# Patient Record
Sex: FEMALE | Race: OTHER | Employment: PART TIME | URBAN - METROPOLITAN AREA
[De-identification: names, ages, dates, MRNs, and addresses within clinical notes are randomized per-mention and may not be internally consistent; named-entity substitution may affect disease eponyms.]

---

## 2023-01-31 ENCOUNTER — APPOINTMENT (OUTPATIENT)
Dept: CT IMAGING | Age: 23
End: 2023-01-31
Attending: EMERGENCY MEDICINE
Payer: MEDICAID

## 2023-01-31 ENCOUNTER — HOSPITAL ENCOUNTER (EMERGENCY)
Age: 23
Discharge: HOME | End: 2023-02-01
Attending: EMERGENCY MEDICINE
Payer: MEDICAID

## 2023-01-31 DIAGNOSIS — F41.1 ANXIETY REACTION: ICD-10-CM

## 2023-01-31 DIAGNOSIS — N30.00 ACUTE CYSTITIS WITHOUT HEMATURIA: ICD-10-CM

## 2023-01-31 DIAGNOSIS — G43.809 OTHER MIGRAINE WITHOUT STATUS MIGRAINOSUS, NOT INTRACTABLE: Primary | ICD-10-CM

## 2023-01-31 LAB
ALBUMIN SERPL-MCNC: 4.2 G/DL (ref 3.5–5)
ALBUMIN/GLOB SERPL: 1.2 (ref 1.1–2.2)
ALP SERPL-CCNC: 80 U/L (ref 45–117)
ALT SERPL-CCNC: 24 U/L (ref 12–78)
ANION GAP BLD CALC-SCNC: 9 (ref 10–20)
ANION GAP SERPL CALC-SCNC: 6 MMOL/L (ref 5–15)
AST SERPL-CCNC: 16 U/L (ref 15–37)
BASE DEFICIT BLD-SCNC: 1.4 MMOL/L
BASOPHILS # BLD: 0.1 K/UL (ref 0–0.1)
BASOPHILS NFR BLD: 1 % (ref 0–1)
BILIRUB SERPL-MCNC: 0.3 MG/DL (ref 0.2–1)
BUN SERPL-MCNC: 12 MG/DL (ref 6–20)
BUN/CREAT SERPL: 18 (ref 12–20)
CA-I BLD-MCNC: 1.19 MMOL/L (ref 1.12–1.32)
CALCIUM SERPL-MCNC: 9.2 MG/DL (ref 8.5–10.1)
CHLORIDE BLD-SCNC: 107 MMOL/L (ref 100–108)
CHLORIDE SERPL-SCNC: 107 MMOL/L (ref 97–108)
CO2 BLD-SCNC: 24 MMOL/L (ref 19–24)
CO2 SERPL-SCNC: 24 MMOL/L (ref 21–32)
COMMENT, HOLDF: NORMAL
CREAT SERPL-MCNC: 0.68 MG/DL (ref 0.55–1.02)
CREAT UR-MCNC: 0.6 MG/DL (ref 0.6–1.3)
DIFFERENTIAL METHOD BLD: ABNORMAL
EOSINOPHIL # BLD: 0.1 K/UL (ref 0–0.4)
EOSINOPHIL NFR BLD: 1 % (ref 0–7)
ERYTHROCYTE [DISTWIDTH] IN BLOOD BY AUTOMATED COUNT: 12.6 % (ref 11.5–14.5)
GLOBULIN SER CALC-MCNC: 3.5 G/DL (ref 2–4)
GLUCOSE BLD STRIP.AUTO-MCNC: 99 MG/DL (ref 74–106)
GLUCOSE SERPL-MCNC: 99 MG/DL (ref 65–100)
HCO3 BLDA-SCNC: 25 MMOL/L
HCT VFR BLD AUTO: 42.6 % (ref 35–47)
HGB BLD-MCNC: 14 G/DL (ref 11.5–16)
IMM GRANULOCYTES # BLD AUTO: 0 K/UL (ref 0–0.04)
IMM GRANULOCYTES NFR BLD AUTO: 0 % (ref 0–0.5)
LACTATE BLD-SCNC: 0.75 MMOL/L (ref 0.4–2)
LYMPHOCYTES # BLD: 2.7 K/UL (ref 0.8–3.5)
LYMPHOCYTES NFR BLD: 24 % (ref 12–49)
MCH RBC QN AUTO: 30.2 PG (ref 26–34)
MCHC RBC AUTO-ENTMCNC: 32.9 G/DL (ref 30–36.5)
MCV RBC AUTO: 91.8 FL (ref 80–99)
MONOCYTES # BLD: 1.3 K/UL (ref 0–1)
MONOCYTES NFR BLD: 11 % (ref 5–13)
NEUTS SEG # BLD: 6.9 K/UL (ref 1.8–8)
NEUTS SEG NFR BLD: 63 % (ref 32–75)
NRBC # BLD: 0 K/UL (ref 0–0.01)
NRBC BLD-RTO: 0 PER 100 WBC
PCO2 BLDV: 45.3 MMHG (ref 41–51)
PH BLDV: 7.34 (ref 7.32–7.42)
PLATELET # BLD AUTO: 356 K/UL (ref 150–400)
PMV BLD AUTO: 9.4 FL (ref 8.9–12.9)
PO2 BLDV: 35 MMHG (ref 25–40)
POTASSIUM BLD-SCNC: 4.2 MMOL/L (ref 3.5–5.5)
POTASSIUM SERPL-SCNC: 3.9 MMOL/L (ref 3.5–5.1)
PROT SERPL-MCNC: 7.7 G/DL (ref 6.4–8.2)
RBC # BLD AUTO: 4.64 M/UL (ref 3.8–5.2)
SAMPLES BEING HELD,HOLD: NORMAL
SAO2 % BLD: 64 %
SODIUM BLD-SCNC: 140 MMOL/L (ref 136–145)
SODIUM SERPL-SCNC: 137 MMOL/L (ref 136–145)
SPECIMEN SITE: ABNORMAL
WBC # BLD AUTO: 11 K/UL (ref 3.6–11)

## 2023-01-31 PROCEDURE — 96375 TX/PRO/DX INJ NEW DRUG ADDON: CPT

## 2023-01-31 PROCEDURE — 85025 COMPLETE CBC W/AUTO DIFF WBC: CPT

## 2023-01-31 PROCEDURE — 96361 HYDRATE IV INFUSION ADD-ON: CPT

## 2023-01-31 PROCEDURE — 96374 THER/PROPH/DIAG INJ IV PUSH: CPT

## 2023-01-31 PROCEDURE — 70450 CT HEAD/BRAIN W/O DYE: CPT

## 2023-01-31 PROCEDURE — 80053 COMPREHEN METABOLIC PANEL: CPT

## 2023-01-31 PROCEDURE — 99285 EMERGENCY DEPT VISIT HI MDM: CPT

## 2023-01-31 PROCEDURE — 74011250636 HC RX REV CODE- 250/636: Performed by: EMERGENCY MEDICINE

## 2023-01-31 PROCEDURE — 82947 ASSAY GLUCOSE BLOOD QUANT: CPT

## 2023-01-31 PROCEDURE — 36415 COLL VENOUS BLD VENIPUNCTURE: CPT

## 2023-01-31 RX ORDER — KETOROLAC TROMETHAMINE 30 MG/ML
15 INJECTION, SOLUTION INTRAMUSCULAR; INTRAVENOUS
Status: COMPLETED | OUTPATIENT
Start: 2023-01-31 | End: 2023-01-31

## 2023-01-31 RX ORDER — LORAZEPAM 2 MG/ML
1 INJECTION INTRAMUSCULAR ONCE
Status: COMPLETED | OUTPATIENT
Start: 2023-01-31 | End: 2023-01-31

## 2023-01-31 RX ORDER — PROCHLORPERAZINE EDISYLATE 5 MG/ML
10 INJECTION INTRAMUSCULAR; INTRAVENOUS
Status: COMPLETED | OUTPATIENT
Start: 2023-01-31 | End: 2023-01-31

## 2023-01-31 RX ADMIN — PROCHLORPERAZINE EDISYLATE 10 MG: 5 INJECTION INTRAMUSCULAR; INTRAVENOUS at 23:36

## 2023-01-31 RX ADMIN — KETOROLAC TROMETHAMINE 15 MG: 30 INJECTION, SOLUTION INTRAMUSCULAR at 23:36

## 2023-01-31 RX ADMIN — LORAZEPAM 1 MG: 2 INJECTION INTRAMUSCULAR; INTRAVENOUS at 23:23

## 2023-01-31 RX ADMIN — SODIUM CHLORIDE 1000 ML: 9 INJECTION, SOLUTION INTRAVENOUS at 23:39

## 2023-02-01 VITALS
RESPIRATION RATE: 18 BRPM | SYSTOLIC BLOOD PRESSURE: 99 MMHG | HEART RATE: 101 BPM | DIASTOLIC BLOOD PRESSURE: 54 MMHG | OXYGEN SATURATION: 98 % | TEMPERATURE: 98.2 F

## 2023-02-01 LAB
AMPHET UR QL SCN: NEGATIVE
APPEARANCE UR: CLEAR
BACTERIA URNS QL MICRO: ABNORMAL /HPF
BARBITURATES UR QL SCN: NEGATIVE
BENZODIAZ UR QL: NEGATIVE
BILIRUB UR QL: NEGATIVE
CANNABINOIDS UR QL SCN: NEGATIVE
COCAINE UR QL SCN: NEGATIVE
COLOR UR: ABNORMAL
DRUG SCRN COMMENT,DRGCM: NORMAL
EPITH CASTS URNS QL MICRO: ABNORMAL /LPF
GLUCOSE UR STRIP.AUTO-MCNC: NEGATIVE MG/DL
HGB UR QL STRIP: NEGATIVE
HYALINE CASTS URNS QL MICRO: ABNORMAL /LPF (ref 0–2)
KETONES UR QL STRIP.AUTO: 40 MG/DL
LEUKOCYTE ESTERASE UR QL STRIP.AUTO: ABNORMAL
METHADONE UR QL: NEGATIVE
NITRITE UR QL STRIP.AUTO: NEGATIVE
OPIATES UR QL: NEGATIVE
PCP UR QL: NEGATIVE
PH UR STRIP: 5.5 (ref 5–8)
PROT UR STRIP-MCNC: NEGATIVE MG/DL
RBC #/AREA URNS HPF: ABNORMAL /HPF (ref 0–5)
SP GR UR REFRACTOMETRY: 1.02
UA: UC IF INDICATED,UAUC: ABNORMAL
UROBILINOGEN UR QL STRIP.AUTO: 0.2 EU/DL (ref 0.2–1)
WBC URNS QL MICRO: ABNORMAL /HPF (ref 0–4)

## 2023-02-01 PROCEDURE — 81001 URINALYSIS AUTO W/SCOPE: CPT

## 2023-02-01 PROCEDURE — 80307 DRUG TEST PRSMV CHEM ANLYZR: CPT

## 2023-02-01 PROCEDURE — 74011250637 HC RX REV CODE- 250/637: Performed by: EMERGENCY MEDICINE

## 2023-02-01 RX ORDER — BUTALBITAL, ACETAMINOPHEN AND CAFFEINE 300; 40; 50 MG/1; MG/1; MG/1
1 CAPSULE ORAL
Qty: 16 CAPSULE | Refills: 0 | Status: SHIPPED | OUTPATIENT
Start: 2023-02-01 | End: 2023-02-04

## 2023-02-01 RX ORDER — CEPHALEXIN 500 MG/1
500 CAPSULE ORAL 2 TIMES DAILY
Qty: 10 CAPSULE | Refills: 0 | Status: SHIPPED | OUTPATIENT
Start: 2023-02-01 | End: 2023-02-06

## 2023-02-01 RX ORDER — IBUPROFEN 800 MG/1
800 TABLET ORAL
Qty: 20 TABLET | Refills: 0 | Status: SHIPPED | OUTPATIENT
Start: 2023-02-01 | End: 2023-02-08

## 2023-02-01 RX ORDER — CEPHALEXIN 250 MG/1
500 CAPSULE ORAL
Status: COMPLETED | OUTPATIENT
Start: 2023-02-01 | End: 2023-02-01

## 2023-02-01 RX ADMIN — CEPHALEXIN 500 MG: 250 CAPSULE ORAL at 04:15

## 2023-02-01 NOTE — DISCHARGE INSTRUCTIONS
It was a pleasure taking care of you in our Emergency Department today. We know that when you come to [facility], you are entrusting us with your health, comfort, and safety. Our physicians and nurses honor that trust, and truly appreciate the opportunity to care for you and your loved ones. We also value your feedback. If you receive a survey about your Emergency Department experience today, please fill it out. We care about our patients' feedback, and we listen to what you have to say.   Thank you!       --- Dr. Roberto Verdin MD

## 2023-02-01 NOTE — ED NOTES
Patient back to baseline per NIHS scale.   says she still notices her talking slower but her screening is negative

## 2023-02-01 NOTE — ED PROVIDER NOTES
[facility] EMERGENCY DEPT  EMERGENCY DEPARTMENT ENCOUNTER       Pt Name: Peggy Meek  MRN: [id number]  [place] 2000  Date of evaluation: 1/31/2023  Provider: Marcellus Ferro MD   PCP: None  Note Started: 11:30 PM 1/31/23     CHIEF COMPLAINT       Chief Complaint   Patient presents with    Tremors     Patient's significant other provides triage information. Patient has had a migraine for the past 3 days, 1900 tonight began with aphasia and garbled speech with slow responses and tremors. Patient reported a significant sharp headache 9/10 at 1700, has resolved to 6/10 at this time. Patient has a history of migraines and multiple concussions, last diagnosed concussion was 2 months ago and SO states she hit her head on the wall 3 weeks ago. HISTORY OF PRESENT ILLNESS: 1 or more elements      History From: Patient and significant other  HPI Limitations : Other (speech changes, unable to provide full history)     Peggy Meek is a 23 y.o. female with history of migraine headaches who presents with a migraine that started 3 days ago and has been up until today typical of her prior migraines. History limited as patient has a generalized tremor, seems very anxious and stuttering which is making it difficult for her to provide full history. History supplemented by significant other who has been with patient all day. She was brought by SO to ed via private vehicle due to generalized tremor and the aforementioned speech changes. She was doing well until about 5pm when she developed a worsening of her typical headache which is occipital and radiates to top of her head. Over past three days has been waxing and waning in intensity, around 5pm got slightly worse and since then improved. Described as sharp, sometimes throbbing. Around the same time she also started having anxiety accompanied by a generalized tremor and difficulty expressing herself due to stuttering.  She is not slurring her words and does not have word finding difficulty. No fever, chills or other recent illness. Takes otc meds prn for headaches, no other meds. Denies associated vision changes or focal weakness or numbness. Normal mental status, following all commands. Based on timeline and atypical nature of symptoms including the new speech deficit, level 2 code stroke alert was called by me from triage. Please see more comprehensive history below under MDM  Nursing Notes were all reviewed in real time as they are made available. Any disagreements addressed in the HPI/MDM. REVIEW OF SYSTEMS      Review of Systems   Constitutional:  Negative for chills and fever. HENT:  Negative for congestion, ear pain, postnasal drip, rhinorrhea, sinus pressure, sore throat, trouble swallowing and voice change. Eyes:  Negative for photophobia and visual disturbance. Respiratory:  Negative for cough and shortness of breath. Cardiovascular:  Negative for chest pain, palpitations and leg swelling. Gastrointestinal:  Negative for abdominal pain, diarrhea, nausea and vomiting. Genitourinary:  Negative for dysuria, flank pain, frequency, hematuria, pelvic pain, vaginal bleeding and vaginal discharge. Musculoskeletal:  Negative for back pain, neck pain and neck stiffness. Allergic/Immunologic: Negative for immunocompromised state. Neurological:  Positive for tremors, speech difficulty and headaches. Negative for dizziness, seizures, syncope, facial asymmetry, weakness, light-headedness and numbness. Hematological:  Negative for adenopathy. Psychiatric/Behavioral:  Negative for confusion. The patient is nervous/anxious. Positives and Pertinent negatives as per HPI and MDM. PAST HISTORY     Past Medical History:  migraines    Past Surgical History:  No past surgical history on file. Family History:  No family history on file. Social History: Allergies:   Allergies   Allergen Reactions    Shellfish Derived Anaphylaxis CURRENT MEDICATIONS      Previous Medications    No medications on file         PHYSICAL EXAM      ED Triage Vitals [01/31/23 2252]   ED Encounter Vitals Group      /78      Pulse (Heart Rate) (!) 110      Resp Rate 20      Temp       Temp src       O2 Sat (%) 98 %      Weight       Height         Physical Exam  Vitals and nursing note reviewed. Constitutional:       Appearance: She is not diaphoretic. HENT:      Head: Atraumatic. Nose: Nose normal.      Mouth/Throat:      Mouth: Mucous membranes are moist.   Eyes:      Extraocular Movements: Extraocular movements intact. Conjunctiva/sclera: Conjunctivae normal.      Pupils: Pupils are equal, round, and reactive to light. Neck:      Vascular: No carotid bruit. Cardiovascular:      Rate and Rhythm: Normal rate and regular rhythm. Pulses: Normal pulses. Heart sounds: Normal heart sounds. Pulmonary:      Effort: Pulmonary effort is normal.      Breath sounds: Normal breath sounds. Abdominal:      Palpations: Abdomen is soft. Tenderness: There is no abdominal tenderness. There is no right CVA tenderness or left CVA tenderness. Musculoskeletal:      Cervical back: Normal range of motion and neck supple. No rigidity or tenderness. Lymphadenopathy:      Cervical: No cervical adenopathy. Neurological:      Mental Status: She is alert. Psychiatric:         Attention and Perception: She does not perceive auditory or visual hallucinations. Mood and Affect: Mood is anxious. Speech: Speech is not slurred. Behavior: Behavior is cooperative.           DIAGNOSTIC RESULTS   LABS:     Recent Results (from the past 24 hour(s))   CBC WITH AUTOMATED DIFF    Collection Time: 01/31/23 10:40 PM   Result Value Ref Range    WBC 11.0 3.6 - 11.0 K/uL    RBC 4.64 3.80 - 5.20 M/uL    HGB 14.0 11.5 - 16.0 g/dL    HCT 42.6 35.0 - 47.0 %    MCV 91.8 80.0 - 99.0 FL    MCH 30.2 26.0 - 34.0 PG    MCHC 32.9 30.0 - 36.5 g/dL RDW 12.6 11.5 - 14.5 %    PLATELET 318 370 - 869 K/uL    MPV 9.4 8.9 - 12.9 FL    NRBC 0.0 0  WBC    ABSOLUTE NRBC 0.00 0.00 - 0.01 K/uL    NEUTROPHILS 63 32 - 75 %    LYMPHOCYTES 24 12 - 49 %    MONOCYTES 11 5 - 13 %    EOSINOPHILS 1 0 - 7 %    BASOPHILS 1 0 - 1 %    IMMATURE GRANULOCYTES 0 0.0 - 0.5 %    ABS. NEUTROPHILS 6.9 1.8 - 8.0 K/UL    ABS. LYMPHOCYTES 2.7 0.8 - 3.5 K/UL    ABS. MONOCYTES 1.3 (H) 0.0 - 1.0 K/UL    ABS. EOSINOPHILS 0.1 0.0 - 0.4 K/UL    ABS. BASOPHILS 0.1 0.0 - 0.1 K/UL    ABS. IMM. GRANS. 0.0 0.00 - 0.04 K/UL    DF AUTOMATED     METABOLIC PANEL, COMPREHENSIVE    Collection Time: 01/31/23 10:40 PM   Result Value Ref Range    Sodium 137 136 - 145 mmol/L    Potassium 3.9 3.5 - 5.1 mmol/L    Chloride 107 97 - 108 mmol/L    CO2 24 21 - 32 mmol/L    Anion gap 6 5 - 15 mmol/L    Glucose 99 65 - 100 mg/dL    BUN 12 6 - 20 MG/DL    Creatinine 0.68 0.55 - 1.02 MG/DL    BUN/Creatinine ratio 18 12 - 20      eGFR >60 >60 ml/min/1.73m2    Calcium 9.2 8.5 - 10.1 MG/DL    Bilirubin, total 0.3 0.2 - 1.0 MG/DL    ALT (SGPT) 24 12 - 78 U/L    AST (SGOT) 16 15 - 37 U/L    Alk. phosphatase 80 45 - 117 U/L    Protein, total 7.7 6.4 - 8.2 g/dL    Albumin 4.2 3.5 - 5.0 g/dL    Globulin 3.5 2.0 - 4.0 g/dL    A-G Ratio 1.2 1.1 - 2.2     SAMPLES BEING HELD    Collection Time: 01/31/23 10:40 PM   Result Value Ref Range    SAMPLES BEING HELD BLUE, RED, PST, SST     COMMENT        Add-on orders for these samples will be processed based on acceptable specimen integrity and analyte stability, which may vary by analyte.    BLOOD GAS,CHEM8,LACTIC ACID POC    Collection Time: 01/31/23 10:42 PM   Result Value Ref Range    pH, venous (POC) 7.34 7.32 - 7.42      pCO2, venous (POC) 45.3 41 - 51 MMHG    pO2, venous (POC) 35 25 - 40 mmHg    BICARBONATE 25 mmol/L    Base deficit (POC) 1.4 mmol/L    O2 SAT 64 %    Sodium,  136 - 145 MMOL/L    Potassium, POC 4.2 3.5 - 5.5 MMOL/L    Chloride,  100 - 108 MMOL/L CO2, POC 24 19 - 24 MMOL/L    Anion gap, POC 9 (L) 10 - 20      Glucose, POC 99 74 - 106 MG/DL    Creatinine, POC 0.6 0.6 - 1.3 MG/DL    eGFR (POC) >60 >60 ml/min/1.73m2    Calcium, ionized (POC) 1.19 1.12 - 1.32 mmol/L    Lactic Acid (POC) 0.75 0.40 - 2.00 mmol/L    Sample source VENOUS BLOOD     URINALYSIS W/ REFLEX CULTURE    Collection Time: 02/01/23  1:38 AM    Specimen: Urine   Result Value Ref Range    Color YELLOW/STRAW      Appearance CLEAR CLEAR      Specific gravity 1.021      pH (UA) 5.5 5.0 - 8.0      Protein Negative NEG mg/dL    Glucose Negative NEG mg/dL    Ketone 40 (A) NEG mg/dL    Bilirubin Negative NEG      Blood Negative NEG      Urobilinogen 0.2 0.2 - 1.0 EU/dL    Nitrites Negative NEG      Leukocyte Esterase SMALL (A) NEG      UA:UC IF INDICATED CULTURE NOT INDICATED BY UA RESULT CNI      WBC 5-10 0 - 4 /hpf    RBC 0-5 0 - 5 /hpf    Epithelial cells MANY (A) FEW /lpf    Bacteria 3+ (A) NEG /hpf    Hyaline cast 0-2 0 - 2 /lpf   DRUG SCREEN, URINE    Collection Time: 02/01/23  1:38 AM   Result Value Ref Range    AMPHETAMINES Negative NEG      BARBITURATES Negative NEG      BENZODIAZEPINES Negative NEG      COCAINE Negative NEG      METHADONE Negative NEG      OPIATES Negative NEG      PCP(PHENCYCLIDINE) Negative NEG      THC (TH-CANNABINOL) Negative NEG      Drug screen comment (NOTE)            RADIOLOGY:  Non-plain film images such as CT, Ultrasound and MRI are read by the radiologist. Plain radiographic images are visualized and preliminarily interpreted by the ED Provider with the below findings:       Interpretation per the Radiologist below, if available at the time of this note:     CT CODE NEURO HEAD WO CONTRAST   Final Result   No acute abnormality.                  PROCEDURES       SCREENINGS   NIHSS - scores 1 for aphasia       CRITICAL CARE TIME   CRITICAL CARE NOTE (does not include separately billable procedure time)::    IMPENDING DETERIORATION -CNS  ASSOCIATED RISK FACTORS - CNS Decompensation  MANAGEMENT- Bedside Assessment and Supervision of Care  INTERPRETATION -  CT Scan, Blood Gases, and ECG  INTERVENTIONS - hemodynamic mngmt and Neurologic interventions   CASE REVIEW - Medical Sub-Specialist, Nursing, and Family  TREATMENT RESPONSE -Improved  PERFORMED BY - Self    NOTES   :    I have spent 40 minutes of critical care time involved in lab review, consultations with specialist, family decision- making, bedside attention and documentation. During this entire length of time I was immediately available to the patient. (This does not include time spent on performing separately billable procedures)    Critical Care: The reason for providing this level of medical care for this critically ill patient was due to a critical illness that impaired one or more vital organ systems, such that there was a high probability of imminent or life threatening deterioration in the patient's condition. This care involved high complexity decision making to assess, manipulate, and support vital system functions, to treat this degree of vital organ system failure, and to prevent further life threatening deterioration of the patients condition. Hina Freed MD       EMERGENCY DEPARTMENT COURSE and DIFFERENTIAL DIAGNOSIS/MDM     Vitals:    Vitals:    02/01/23 0000 02/01/23 0025 02/01/23 0038 02/01/23 0236   BP: (!) 92/48  (!) 96/57 (!) 99/54   Pulse:    (!) 101   Resp:    18   Temp:  98.2 °F (36.8 °C)     SpO2:           Pertinent Chronic Medical Conditions or Prior Surgeries: migraine headaches    Social Determinants affecting Dx or Tx: None    Records Reviewed: Nursing notes      CC/HPI Summary, DDx, ED Course, and Reassessment:   Patient presents with a 3-day history of a headache, typical of prior migraines. Today developed worsening of her headache around 5pm, followed by speech difficulty, generalized tremor and anxiety as described in hpi.   She has no focal neurologic deficits, is alert and oriented, following commands. Although her headache has started to improve, she continues to feel anxious and still has a generalized tremor in all 4 extremities. Stuttering when trying to speak although this started improving during course of my evaluation. No fever, chills or nuchal rigidity. Rest of exam is normal.    Ddx considered: atypical migraine, anxiety reaction both of which are most likely. Considered and discussed with neuro possibility of seizure, stroke, intracranial bleed such as a sah, brain mass, meningitis but these were ruled out based on low suspicion given history and exam and/or based on on lab and imaging studies. She was given 1mg iv ativan prior to ct due to persistent tremor which I believe is driven by anxiety but made brain imaging difficult. Tremor improved then resolved after the ativan. speech subsequently also normalized and she felt better. Headache still there but much improved. No indication for LP. Discussed case with neuro who saw patient in ct scanner after she obtained dry ct brain which was normal. Per neuro, no additional brain imaging indicated as symptoms are improving and unlikely to be caused by an acute intracranial process. Plan instead is to treat migraine observe and reassess. If she continues to improve ok to dc home with fup in neuro clinic. Medical Decision Making  Amount and/or Complexity of Data Reviewed  Labs: ordered. Radiology: ordered. ECG/medicine tests: ordered. Risk  Prescription drug management. Consult Note:  Sadaf Smith MD spoke with teleneurologist dr. Walter Martinez at 1055pm,   Discussed pt's hx, physical exam and management so far. Agree with initial assessment and management. Reviewed patient's CT scan which is normal. Low concern for central/intracranial process such as seizures, cva or infection. Does not recommend any further brain imaging. Recommend treating headache with iv compazine and reassess after.  If patient is feeling better, ok to dc home with neuro follow up.    1:37 AM  Patient sleeping comfortably. Imaging and labs so far are normal. Urine still pending. She is finishing her fluid bolus, will reassess after and ambulate. Agree with plan to dc home if she is feeling better. 4:16 AM  Patient reassessed. States headache is resolved and she is feeling a lot better. Tolerating po. Normal speech. No longer has a tremor. Ambulating with normal gait. UA results suggestive of possible UTI. Will start abx, send cx. Vitals remained stable. Patient reports baseline low Bps and states that current Bps are her normal. Not lightheaded. Disposition Considerations (Tests not done, Shared Decision Making, Pt Expectation of Test or Tx.):    I have discussed with the patient and/or caregiver my initial clinical impression which is based on an evidence-based clinical evaluation of the patient and interpretation of available results. Involved patient and/or caregiver in management, treatment options and final disposition. Patient and/or caregiver verbalizes understanding and agreement.     ED Medications Administered  Medications   iopamidoL (ISOVUE-370) 370 mg iodine /mL (76 %) injection 100 mL (has no administration in time range)   LORazepam (ATIVAN) injection 1 mg (1 mg IntraVENous Given 1/31/23 2323)   prochlorperazine (COMPAZINE) injection 10 mg (10 mg IntraVENous Given 1/31/23 2336)   ketorolac (TORADOL) injection 15 mg (15 mg IntraVENous Given 1/31/23 2336)   sodium chloride 0.9 % bolus infusion 1,000 mL (1,000 mL IntraVENous New Bag 1/31/23 2339)   cephALEXin (KEFLEX) capsule 500 mg (500 mg Oral Given 2/1/23 2507)       ED Orders Placed:  Orders Placed This Encounter    CT CODE NEURO HEAD WO CONTRAST    CBC WITH AUTOMATED DIFF    METABOLIC PANEL, COMPREHENSIVE    URINALYSIS W/ REFLEX CULTURE    DRUG SCREEN, URINE    Hold Sample    PROTHROMBIN TIME + INR    DIET NPO    POC GLUCOSE    CARDIAC MONITOR - ED ONLY    PULSE OXIMETRY CONTINUOUS    VITAL SIGNS    INITIATE NURSING DYSPHAGIA SCREENING    MEASURE HEIGHT    WEIGH PATIENT    NEUROLOGIC STATUS ASSESSMENT    NIH STROKE SCALE ASSESSMENT    ELEVATE HEAD OF BED    OXYGEN CANNULA    POC GLUCOSE    POC URINE PREGNANCY TEST    POC CHEM8    BLOOD GAS,CHEM8,LACTIC ACID POC    EKG, 12 LEAD, INITIAL    INSERT PERIPHERAL IV ONE TIME STAT    SALINE LOCK IV ONE TIME STAT    LORazepam (ATIVAN) injection 1 mg    iopamidoL (ISOVUE-370) 370 mg iodine /mL (76 %) injection 100 mL    prochlorperazine (COMPAZINE) injection 10 mg    ketorolac (TORADOL) injection 15 mg    sodium chloride 0.9 % bolus infusion 1,000 mL    butalbital-acetaminophen-caff (Fioricet) -40 mg per capsule    ibuprofen (MOTRIN) 800 mg tablet    cephALEXin (KEFLEX) capsule 500 mg    cephALEXin (Keflex) 500 mg capsule         FINAL IMPRESSION     1. Other migraine without status migrainosus, not intractable    2. Anxiety reaction    3. Acute cystitis without hematuria          DISPOSITION/PLAN     Progress note:  Patient has been reassessed and reports feeling considerably better, has normal vital signs and feels comfortable going home. I think this is reasonable as no findings today suggest a life-threatening condition. DISPOSITION: DISCHARGE  The patient's results have been reviewed with patient and available family and/or caregiver. They verbally convey their understanding and agreement of the patient's signs, symptoms, diagnosis, treatment and prognosis and additionally agree to follow up as recommended in the discharge instructions or to return to the Emergency Department should the patient's condition change prior to their follow-up appointment. The patient and available family and/or caregiver verbally agree with the care plan and all of their questions have been answered.  The discharge instructions have also been provided to the them with educational information regarding the patient's diagnosis as well a list of reasons why the patient would want to return to the ER prior to their follow-up appointment should any concerns arise, the patient's condition change or symptoms worsen. Heather Cook MD, Msc    PLAN:  Current Discharge Medication List        START taking these medications    Details   butalbital-acetaminophen-caff (Fioricet) -40 mg per capsule Take 1 Capsule by mouth every four (4) hours as needed for Headache for up to 3 days. Qty: 16 Capsule, Refills: 0  Start date: 2/1/2023, End date: 2/4/2023      ibuprofen (MOTRIN) 800 mg tablet Take 1 Tablet by mouth every six (6) hours as needed for Pain for up to 7 days. Qty: 20 Tablet, Refills: 0  Start date: 2/1/2023, End date: 2/8/2023      cephALEXin (Keflex) 500 mg capsule Take 1 Capsule by mouth two (2) times a day for 5 days. Qty: 10 Capsule, Refills: 0  Start date: 2/1/2023, End date: 2/6/2023           2. Follow-up Information       Follow up With Specialties Details Why Contact Info    [facility]  Schedule an appointment as soon as possible for a visit   [address] [facility] [address]   [phone]    [facility] EMERGENCY DEPT Emergency Medicine Go to  As needed, If symptoms worsen [address] [place] [address]  [phone]    primary care doctor  Schedule an appointment as soon as possible for a visit in 3 days            3. Return to ED if worse       I am the Primary Clinician of Record. Deanna Umana MD (electronically signed)    (Please note that parts of this dictation were completed with voice recognition software. Quite often unanticipated grammatical, syntax, homophones, and other interpretive errors are inadvertently transcribed by the computer software. Please disregards these errors.  Please excuse any errors that have escaped final proofreading.)